# Patient Record
(demographics unavailable — no encounter records)

---

## 2024-11-16 NOTE — HISTORY OF PRESENT ILLNESS
[FreeTextEntry1] : 80 yo male with HTN, HLD, prior CVAs (ischemic 2007, hemorrhagic 2015, recurrent event 2022), and paroxysmal atrial fibrillation, who presents today to establish care with new cardiologist since Dr. Redd's [facility]. Patient denies chest pain, dyspnea, palpitations, syncope, edema, melena, hematochezia, or hematemesis.

## 2024-11-16 NOTE — HISTORY OF PRESENT ILLNESS
[FreeTextEntry1] : 80 yo male with HTN, HLD, prior CVAs (ischemic 2007, hemorrhagic 2015, recurrent event 2022), and paroxysmal atrial fibrillation, who presents today to establish care with new cardiologist since [facility]. Patient denies chest pain, dyspnea, palpitations, syncope, edema, melena, hematochezia, or hematemesis.

## 2024-11-16 NOTE — ASSESSMENT
[FreeTextEntry1] : 82 yo male with HTN, HLD, prior CVAs (ischemic 2007, hemorrhagic 2015, recurrent event 2022), and paroxysmal atrial fibrillation.  ECG today demonstrated sinus rhythm, rate 63 bpm, low voltage QRS, non-specific ST abnormalities. Will continue Eliquis 5 mg po bid for thromboembolic prophylaxis. Will continue metoprolol tartrate 50 mg po bid.  Will continue aspirin 81 mg po daily given prior CVAs.  BP is controlled. Will continue amlodipine 5 mg po daily, metoprolol, and spironolactone 25 mg po daily. Will continue rosuvastatin 40 mg po daily for hyperlipidemia management.

## 2024-11-16 NOTE — CARDIOLOGY SUMMARY
[de-identified] : 11/14/24 ECG: Sinus rhythm, rate 63 bpm, low voltage QRS, non-specific ST abnormalities [de-identified] : 6/14/18 Echo: Normal LV function. EF 60%. Mild diastolic dysfunction.

## 2024-11-16 NOTE — CARDIOLOGY SUMMARY
[de-identified] : 11/14/24 ECG: Sinus rhythm, rate 63 bpm, low voltage QRS, non-specific ST abnormalities [de-identified] : 6/14/18 Echo: Normal LV function. EF 60%. Mild diastolic dysfunction.

## 2024-12-30 NOTE — REVIEW OF SYSTEMS
[Skin Rash] : skin rash [Easy Bruising] : easy bruising [Fever] : no fever [Chills] : no chills [Chest Pain] : no chest pain [Shortness Of Breath] : no shortness of breath [Abdominal Pain] : no abdominal pain [de-identified] : Induration, swelling, pain

## 2024-12-30 NOTE — PHYSICAL EXAM
[Well Nourished] : well nourished [Well Developed] : well developed [Well-Appearing] : well-appearing [Normal Sclera/Conjunctiva] : normal sclera/conjunctiva [No Respiratory Distress] : no respiratory distress  [de-identified] : Elderly male awake and alert in no acute distress [de-identified] : Left lower extremity with anterior lateral hematoma with localized cellulitis with induration on exam by CP

## 2024-12-30 NOTE — PLAN
[FreeTextEntry1] : Patient has outpatient follow-up scheduled 1/7/2025 Based on the fact the patient has increased pain today recommending Doppler left lower extremity CP to facilitate Doppler and will see patient again tomorrow

## 2024-12-30 NOTE — HISTORY OF PRESENT ILLNESS
[Home] : at home, [unfilled] , at the time of the visit. [Other Location: e.g. Home (Enter Location, City,State)___] : at [unfilled] [Spouse] : spouse [Other:____] : [unfilled] [Verbal consent obtained from patient] : the patient, [unfilled] [FreeTextEntry8] : Called by community paramedic for consultation regarding this 81-year-old male with reported history of left lower extremity hematoma which was noticed on 12/21.  Patient with history of A-fib for which she takes Eliquis for known injury.  Patient has been seen daily by community paramedic and area left anterior lateral lower leg was marked with surgical marking and patient was seen on 12/27 by Dr. Strange and patient had labs at that time and subsequently started on Keflex for possible infected hematoma of left lower leg.  No reported fever.  Patient with noted pain on palpation to inferior medial aspect, no reported drainage or fluctuance.  Erythema has receded distally from prior outline and patient able to move his toes positive good capillary refill

## 2025-07-23 NOTE — HISTORY OF PRESENT ILLNESS
[FreeTextEntry1] : 80 yo male with HTN, HLD, prior CVAs (ischemic 2007, hemorrhagic 2015, recurrent event 2022), and paroxysmal atrial fibrillation, who presents today for follow-up. Patient denies chest pain, dyspnea, palpitations, syncope, edema, melena, hematochezia, or hematemesis.

## 2025-07-23 NOTE — ASSESSMENT
[FreeTextEntry1] : 82 yo male with HTN, HLD, prior CVAs (ischemic 2007, hemorrhagic 2015, recurrent event 2022), and paroxysmal atrial fibrillation.  ECG today demonstrated sinus rhythm, rate 62 bpm. Will continue Eliquis 5 mg po bid for thromboembolic prophylaxis. Will continue metoprolol tartrate 50 mg po bid.  Will continue aspirin 81 mg po daily given prior CVAs.  BP is controlled. Will continue amlodipine 5 mg po daily, metoprolol, and spironolactone 25 mg po daily. Will request most recent labs from PCP for review.  Will continue rosuvastatin 40 mg po daily for hyperlipidemia management.

## 2025-07-23 NOTE — CARDIOLOGY SUMMARY
[de-identified] : 7/22/25 ECG: Sinus rhythm, rate 62 bpm, poor R-wave progression 11/14/24 ECG: Sinus rhythm, rate 63 bpm, low voltage QRS, non-specific ST abnormalities [de-identified] : 6/14/18 Echo: Normal LV function. EF 60%. Mild diastolic dysfunction.

## 2025-07-23 NOTE — CARDIOLOGY SUMMARY
[de-identified] : 7/22/25 ECG: Sinus rhythm, rate 62 bpm, poor R-wave progression 11/14/24 ECG: Sinus rhythm, rate 63 bpm, low voltage QRS, non-specific ST abnormalities [de-identified] : 6/14/18 Echo: Normal LV function. EF 60%. Mild diastolic dysfunction.